# Patient Record
Sex: FEMALE | Race: WHITE | NOT HISPANIC OR LATINO | Employment: UNEMPLOYED | URBAN - METROPOLITAN AREA
[De-identification: names, ages, dates, MRNs, and addresses within clinical notes are randomized per-mention and may not be internally consistent; named-entity substitution may affect disease eponyms.]

---

## 2024-05-08 ENCOUNTER — OFFICE VISIT (OUTPATIENT)
Age: 34
End: 2024-05-08
Payer: COMMERCIAL

## 2024-05-08 VITALS
WEIGHT: 209.7 LBS | DIASTOLIC BLOOD PRESSURE: 76 MMHG | TEMPERATURE: 97.5 F | HEIGHT: 66 IN | SYSTOLIC BLOOD PRESSURE: 120 MMHG | HEART RATE: 81 BPM | BODY MASS INDEX: 33.7 KG/M2

## 2024-05-08 DIAGNOSIS — L40.50 PSORIATIC ARTHRITIS OF MULTIPLE JOINTS: Primary | ICD-10-CM

## 2024-05-08 DIAGNOSIS — R53.83 OTHER FATIGUE: ICD-10-CM

## 2024-05-08 DIAGNOSIS — Z79.899 HIGH RISK MEDICATION USE: ICD-10-CM

## 2024-05-08 DIAGNOSIS — L40.9 PSORIASIS: ICD-10-CM

## 2024-05-08 DIAGNOSIS — E50.9 VITAMIN A DEFICIENCY, UNSPECIFIED: ICD-10-CM

## 2024-05-08 DIAGNOSIS — Z79.899 IMMUNOSUPPRESSION DUE TO DRUG THERAPY: ICD-10-CM

## 2024-05-08 DIAGNOSIS — D84.821 IMMUNOSUPPRESSION DUE TO DRUG THERAPY: ICD-10-CM

## 2024-05-08 DIAGNOSIS — M25.50 ARTHRALGIA OF MULTIPLE SITES: ICD-10-CM

## 2024-11-08 ENCOUNTER — OFFICE VISIT (OUTPATIENT)
Age: 34
End: 2024-11-08
Payer: COMMERCIAL

## 2024-11-08 ENCOUNTER — TELEPHONE (OUTPATIENT)
Age: 34
End: 2024-11-08

## 2024-11-08 VITALS
WEIGHT: 217.7 LBS | TEMPERATURE: 97.2 F | HEIGHT: 66 IN | SYSTOLIC BLOOD PRESSURE: 110 MMHG | BODY MASS INDEX: 34.99 KG/M2 | DIASTOLIC BLOOD PRESSURE: 68 MMHG | HEART RATE: 76 BPM

## 2024-11-08 DIAGNOSIS — Z79.899 IMMUNOSUPPRESSION DUE TO DRUG THERAPY: ICD-10-CM

## 2024-11-08 DIAGNOSIS — D84.821 IMMUNOSUPPRESSION DUE TO DRUG THERAPY: ICD-10-CM

## 2024-11-08 DIAGNOSIS — Z79.899 HIGH RISK MEDICATION USE: ICD-10-CM

## 2024-11-08 DIAGNOSIS — L40.50 PSORIATIC ARTHRITIS OF MULTIPLE JOINTS: Primary | ICD-10-CM

## 2024-11-08 RX ORDER — RISANKIZUMAB-RZAA 150 MG/ML
INJECTION SUBCUTANEOUS
COMMUNITY
Start: 2024-11-05

## 2024-11-08 RX ORDER — IBUPROFEN 800 MG/1
TABLET, FILM COATED ORAL
COMMUNITY
Start: 2024-09-30

## 2024-11-08 RX ORDER — COPPER 313.4 MG/1
1 INTRAUTERINE DEVICE INTRAUTERINE
COMMUNITY

## 2024-11-08 NOTE — PROGRESS NOTES
Office Visit       Date: 11/08/2024   Patient Name: Petty Wilson  MRN: [id number]  YOB: 1990    Referring Physician: Lyric CUEVAS    Chief Complaint: joint pain  Chief Complaint   Patient presents with    Psoriatic arthritis       History of Present Illness: Petty Wilson is a 34 y.o. female who is here today in follow-up for psoriatic rash with psoriasis    She reports she first developed psoriasis around 2022 with the birth of her child.  She has psoriasis scalp, elbows, feet.  For the >2 years she is also experience pain and stiffness particularly in her hands, knees and feet.  She is unable to close her hands completely.  She has morning stiffness for over an hour in her joints.  She notes pain/swelling in her left wrist, finger joints and has difficulty getting her rings on and off.  [facility].  She reports she was given a dose of subcu methotrexate in clinic x1.  She subsequently started on Skyrizi through her dermatologist October 2024.  She has had 2 doses of Skyrizi so far.  Well-tolerated.    No change in her psoriasis or arthritis thus far.      Subjective   Review of Systems: Review of Systems   Constitutional:  Negative for chills, fatigue, fever and unexpected weight loss.   HENT:  Negative for mouth sores, sinus pressure and sore throat.    Eyes:  Negative for pain and redness.   Respiratory:  Negative for cough and shortness of breath.    Cardiovascular:  Negative for chest pain.   Gastrointestinal:  Negative for abdominal pain, blood in stool, diarrhea, nausea, vomiting and GERD.   Endocrine: Negative for polydipsia and polyuria.   Genitourinary:  Negative for dysuria, genital sores and hematuria.   Musculoskeletal:  Positive for arthralgias and joint swelling. Negative for back pain, myalgias, neck pain and neck stiffness.   Skin:  Positive for rash. Negative for bruise.   Allergic/Immunologic: Negative for immunocompromised  state.   Neurological:  Negative for seizures, weakness, numbness and memory problem.   Hematological:  Negative for adenopathy. Does not bruise/bleed easily.   Psychiatric/Behavioral:  Negative for depressed mood. The patient is not nervous/anxious.         Past Medical History:   Past Medical History:   Diagnosis Date    Foot fracture, left     Migraine     Psoriasis        Past Surgical History: History reviewed. No pertinent surgical history.    Family History:   Family History   Problem Relation Age of Onset    Hypertension Mother     Diabetes Son        Social History:   Social History     Socioeconomic History    Marital status: Single   Tobacco Use    Smoking status: Never    Smokeless tobacco: Never   Vaping Use    Vaping status: Never Used   Substance and Sexual Activity    Alcohol use: Yes     Comment: Rarely    Drug use: Never    Sexual activity: Defer       Medications:   Current Outpatient Medications:     ibuprofen (ADVIL,MOTRIN) 800 MG tablet, Take 1 Tablet by mouth 3 (three) times daily as needed for pain, Disp: , Rfl:     Paragard Intrauterine Copper intrauterine device IUD, To be inserted one time by prescriber. Route intrauterine., Disp: , Rfl:     Skyrizi Pen 150 MG/ML solution auto-injector, Inject  under the skin into the appropriate area as directed Every 3 (Three) Months., Disp: , Rfl:     Diclofenac Sodium (VOLTAREN) 1 % gel gel, Apply 2-4 grams by topical route 3-4 times every day to the affected area(s) PRN pain, Disp: 100 g, Rfl: 5    Allergies: No Known Allergies    I have reviewed and updated the patient's chief complaint, history of present illness, review of systems, past medical history, surgical history, family history, social history, medications and allergy list as appropriate.     Objective    Vital Signs:   Vitals:    11/08/24 1015   BP: 110/68   BP Location: Left arm   Patient Position: Sitting   Cuff Size: Adult   Pulse: 76   Temp: 97.2 °F (36.2 °C)   Weight: 98.7 kg (217 lb  "11.2 oz)   Height: 167.6 cm (65.98\")   PainSc:   4       Body mass index is 35.15 kg/m².   BMI cannot be calculated due to outdated height or weight values.  Please input a current height/weight in Vitals and re-renter BMIFOLLOWUP in Note to pull in correct documentation based on BMI range.       Physical Exam:  Physical Exam   MUSCULOSKELETAL:     Swollen and tender second MCP and PIP joints bilateral hands.  Tender left wrist with slight soft tissue swelling ulnar aspect  Unable to fully close hands.  Tender bilateral knees without soft tissue swelling.    Complete joint exam was performed including the MCPs, PIPs, DIPs of the hands, wrists, elbows, shoulders, hips, knees and ankles.  No soft tissue swelling or tenderness is present except as above.    General: The patient is well-developed and well nourished. Cooperative, alert and oriented. Affect is normal. Hydration appears normal.   HEENT: Normocephalic and atraumatic. No notable alopecia. Lids and conjunctiva are normal. Pupils are equal and sclera are clear. Oropharynx is clear   NECK neck is supple without adenopathy, masses or thyromegaly.   CARDIOVASCULAR: Regular rate and rhythm. No murmurs, rubs or gallops   LUNGS: Effort is normal. Lungs are clear bilateral   ABDOMEN: Not examined  EXTREMITIES: Peripheral pulses are intact. No clubbing.   SKIN: Positive psoriasis plaques scalp, extensor surface elbows, feet   no subcutaneous nodules. No digital ulcers. No sclerodactyly.   NEUROLOGIC: Gait is normal. Strength testing is normal.  No focal neurologic deficits    Results Review:   Labs:    No results found for: \"GLUCOSE\", \"BUN\", \"CREATININE\", \"EGFRRESULT\", \"EGFR\", \"BCR\", \"K\", \"CO2\", \"CALCIUM\", \"PROTENTOTREF\", \"ALBUMIN\", \"BILITOT\", \"AST\", \"ALT\"  No results found for: \"WBC\", \"HGB\", \"HCT\", \"MCV\", \"PLT\"  No results found for: \"SEDRATE\"  No results found for: \"CRP\"  No results found for: \"QUANTIFERO\", \"QUANTITB1\", \"QUANTITB2\", \"QUANTIFERN\", \"QUANTIFERM\", " "\"QUANTITBGLDP\"  No results found for: \"RF\"  No results found for: \"HEPBSAG\", \"HEPAIGM\", \"HEPBIGMCORE\", \"HEPCVIRUSABY\"        Procedures      Assessment / Plan   Assessment/plan    Psoriatic arthritis of 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    Brother  at age 3 from rhabdo related to genetic muscle condition  + Psoriasis since   Prior: Methotrexate subq dose x1 only  through derm  *Current: Skyrizi 10/24 through [facility]     -High disease activity from psoriatic arthritis  She just started Skyrizi 1 month ago through dermatology.  2 doses so far.  Well-tolerated.  We discussed that Skyrizi takes 4 to 6 months to be fully effective.  Will give this more time  -We discussed possible addition of methotrexate to the Skyrizi, but she is not interested in this  -Recommend continue as needed NSAID and add topical diclofenac for joint pain  Risks of NSAIDs discussed including GI upset, GI bleeding, renal or hepatic risks and the risk of cardiovascular disease and stroke.  Warned patient not to take other NSAIDs including over-the-counter NSAIDs    Request recent labs and notes from [facility]  Return to clinic 6 months      Psoriasis  [facility]abl[facility] with [facility] in [facility], [facility]    Moderately active-  -started on Skyrizi per dermatology 2024     Arthralgia of multiple sites     Immunosuppression due to drug therapy  Skyrizi per dermatology     we discussed biologic agents at length. Risks and alternative were discussed at length and the option of no treatment was also given. We discussed risks including but not limited to infections which can be unusual,  severe, and deadly. When possible, these agents should be stopped immediately if infections occur. Unusual infection such as TB and fungal infections can occur. There may be an increased risk of lymphoma with these agents. Other risks can include are " multiple sclerosis like illness and worsening of the failure. Infusion or injection reactions whish can be delay have been reported.  Reactivation of daily brain virus hepatitis viruses have been reported.  Worsening of COPD has been seen with Orencia.  Elevated lipids, elevations in liver functions, and dangerous changes in blood counts have been seen with certain agents.  Regular monitoring will be required.We discussed biologic agents at length. Risks and alternative were discussed at length and the option of no treatment was also given. We discussed risks including but not limited to infections which can be unusual,  severe, and deadly. When possible, these agents should be stopped immediately if infections occur. Unusual infection such as TB and fungal infections can occur. There may be an increased risk of lymphoma with these agents. Other risks can include are multiple sclerosis like illness and worsening of the failure. Infusion or injection reactions whish can be delay have been reported.  Reactivation of daily brain virus hepatitis viruses have been reported.  Worsening of COPD has been seen with Orencia.  Elevated lipids, elevations in liver functions, and dangerous changes in blood counts have been seen with certain agents.  Regular monitoring will be required.     High risk medication use           Assessment & Plan  Psoriatic arthritis of multiple joints    Immunosuppression due to drug therapy    High risk medication use           New Medications Ordered This Visit   Medications    Diclofenac Sodium (VOLTAREN) 1 % gel gel     Sig: Apply 2-4 grams by topical route 3-4 times every day to the affected area(s) PRN pain     Dispense:  100 g     Refill:  5         Follow Up:   Return in about 6 months (around 5/8/2025).        Rob Ling MD  [facility] [place], [place] [facility]

## 2024-11-08 NOTE — TELEPHONE ENCOUNTER
Called [facility] and they need a signed medical release form faxed to them before they can send over the records.

## 2024-11-08 NOTE — PATIENT INSTRUCTIONS
Psoriatic Arthritis    Psoriatic arthritis, or PsA, is a long-term (chronic) condition that causes pain, swelling, and stiffness in the joints. The large joints of the legs, hips, and pelvis are most often affected but it can affect any joint in your body.    Most people with PsA have a chronic skin disease that causes itchy scales and patches to form on the skin (psoriasis) before they develop PsA. In some cases, a person may have PsA before or without having psoriasis.    PsA can be mild or severe. If untreated, PsA may cause joint damage.    What are the causes?    The exact cause of this condition is not known. PsA is an autoimmune disease. With this type of disease, the body's defense system (immune system) mistakenly attacks joints and the tissues that connect muscles to joints (tendons).    The disease may be activated by a trigger, such as:  Infections.  Stress.  Alcohol.    What increases the risk?    You are more likely to develop this condition if:  You have psoriasis.  You have a family history of psoriasis or PsA.  You are between the ages of 30 and 50.    What are the signs or symptoms?    The main symptom of this condition is inflammation of joints and tendons. Other symptoms may include:  Joint pain or swelling.  Joint stiffness, especially in the morning.  Swollen fingers and toes.  Pain in areas where tendons connect to bones.  Pitted and weak nails.  Tiredness (fatigue).  Eye redness.    Symptoms of this condition vary from person to person. Symptoms may come and go. Sometimes, symptoms get worse for a period of time. This is called a flare.    How is this diagnosed?    This condition may be diagnosed based on:  Your symptoms and medical history.  A physical exam.  Imaging tests such as an X-ray, a CT scan, or an MRI.  Blood tests to look for inflammation and to rule out other causes, such as gout or rheumatoid arthritis.    You may also be referred to a health care provider who specializes in  treating this condition (rheumatologist).    How is this treated?    The goal of treatment is to reduce pain and inflammation and to protect joints from damage. Treatment depends on how severe the inflammation is and how many joints are affected. Treatment may include:  Medicines.  NSAIDs, such as ibuprofen or naproxen.  Steroids. These can be taken by mouth or injected into a swollen joint.  Disease-modifying anti-rheumatic drugs (DMARDs). These slow the course of the disease.  Biologic medicines. These medicines are usually given as injections or through an IV. They can be very effective, but these medicines can increase the risk of developing infections.  Physical therapy and other exercises to:  Strengthen muscles that support joints.  Prevent joint stiffness.  Lifestyle changes. It is important to rest as needed, eat a healthy diet, and exercise.  A brace or splint to support a painful and swollen joint.  Surgery if you have severe joint damage.  Management of any associated medical conditions. Inflammation from PsA can affect other parts of the body, including the heart, eyes, or kidneys.    Follow these instructions at home:    Managing pain, stiffness, and swelling    If directed, put ice on painful areas. To do this:  If you have a removable brace or splint, remove it as told by your health care provider.  Put ice in a plastic bag.  Place a towel between your skin and the bag.  Leave the ice on for 20 minutes, 2-3 times a day.  Remove the ice if your skin turns bright red. This is very important. If you cannot feel pain, heat, or cold, you have a greater risk of damage to the area.    If you have a removable brace or splint:  Wear the brace or splint as told by your health care provider. Remove it only as told by your health care provider.  Check the skin around the brace or splint every day. Tell your health care provider about any concerns.  Loosen the brace or splint if your fingers or toes tingle,  become numb, or turn cold and blue.  Keep the brace or splint clean and dry.    Activity  Return to your normal activities as told by your health care provider. Ask your health care provider what activities are safe for you.  Get regular exercise. Ask your health care provider what type of exercise is best for you. Your health care provider may recommend:  Low-impact exercises such as walking, biking, or swimming.  Exercises that include stretching, such as nicolette chi and yoga.  Do not exercise when you have a flare of symptoms. Rest until the symptoms improve.    Lifestyle    Maintain a healthy weight. A healthy weight will help you stay active and take stress off your joints.  Eat a healthy diet that includes plenty of vegetables, fruits, whole grains, low-fat dairy products, and lean protein. Do not eat a lot of foods that are high in solid fats, added sugars, or salt.  Use techniques for stress reduction, such as meditation or yoga.  Do not use any products that contain nicotine or tobacco. These products include cigarettes, chewing tobacco, and vaping devices, such as e-cigarettes. If you need help quitting, ask your health care provider.  Consider joining a PsA support group.    General instructions  Take over-the-counter and prescription medicines only as told by your health care provider.  Keep a journal to help track your symptoms. Try to avoid any triggers.  Do not drink alcohol if your health care provider tells you not to drink.  See a mental health therapist if you feel sad, anxious, frustrated, and hopeless. Managing this condition can be challenging and you may feel overwhelmed and depressed.  Keep all follow-up visits. Your health care provider may monitor your condition over time to make sure that it does not cause problems or get worse.    Where to find support  National Psoriasis Foundation: www.psoriasis.org    Where to find more information  American Academy of Dermatology: www.aad.org  American  College of Rheumatology: www.rheumatology.org    Contact a health care provider if:  You have a fever or chills.  Your symptoms get worse.  You feel depressed, frustrated, and hopeless about your condition.    Get help right away if:  You have thoughts of hurting yourself or others.  Get help right away if you feel like you may hurt yourself or others, or have thoughts about taking your own life. Go to your nearest emergency room or:  Call 911.  Call the National Suicide Prevention Lifeline at 1-934.765.7038 or 467. This is open 24 hours a day.  Text the Crisis Text Line at 588891.    Summary  Psoriatic arthritis, or PsA, is a long-term condition that causes pain, swelling, and stiffness in the joints.  The goal of treatment is to reduce pain and inflammation and to protect joints from damage.  Treatment depends on how severe the inflammation is and how many joints are affected.    This information is not intended to replace advice given to you by your health care provider. Make sure you discuss any questions you have with your health care provider.  Document Revised: 02/06/2023 Document Reviewed: 02/06/2023  Elsevier Patient Education © 2024 [employer]

## 2025-05-07 NOTE — PROGRESS NOTES
Office Visit       Date: 05/16/2025   Patient Name: Petty Wilson  MRN: [id number]  YOB: 1990    Referring Physician: Lyric CUEVAS    Chief Complaint: joint pain  Chief Complaint   Patient presents with    Follow-up    Psoriatic arthritid       History of Present Illness: Petty Wilson is a 34 y.o. female who is here today in follow-up for psoriatic arthritis with psoriasis.    History:   She reports she first developed psoriasis around 2022 with the birth of her child.    She has psoriasis scalp, elbows, feet.  For the >2 years she is also experience pain and stiffness particularly in her hands, knees and feet.  She is unable to close her hands completely.  She has morning stiffness for over an hour in her joints.  She notes pain/swelling in her left wrist, finger joints and has difficulty getting her rings on and off.  [facility].  She reports she was given a dose of subcu methotrexate in clinic x1.  She subsequently started on Skyrizi through her dermatologist in [place] October 2024.      Interim 5/16/25:  She reports feeling better. Today she rates her pain 4/10. She has 1 hour of morning stiffness.   She has seen overall improvement since starting Skyrizi.   However, over the last few weeks she has had more psoriasis and joint pain/swelling but thinks this is related to an error with autoinjector.  She believes she lost some of the medication when administering her last dose.  No recent illness/infections. No injection site reactions.   Continues to follow with dermatology.      Subjective   Review of Systems: Review of Systems negative per patient    Past Medical History:   Past Medical History:   Diagnosis Date    Foot fracture, left     Migraine     Psoriasis        Past Surgical History: History reviewed. No pertinent surgical history.    Family History:   Family History   Problem Relation Age of Onset    Hypertension Mother     Diabetes  "Son        Social History:   Social History     Socioeconomic History    Marital status: Single   Tobacco Use    Smoking status: Never    Smokeless tobacco: Never   Vaping Use    Vaping status: Never Used   Substance and Sexual Activity    Alcohol use: Yes     Comment: Rarely    Drug use: Never    Sexual activity: Defer       Medications:   Current Outpatient Medications:     Diclofenac Sodium (VOLTAREN) 1 % gel gel, Apply 2-4 grams by topical route 3-4 times every day to the affected area(s) PRN pain, Disp: 100 g, Rfl: 5    ibuprofen (ADVIL,MOTRIN) 800 MG tablet, Take 1 Tablet by mouth 3 (three) times daily as needed for pain, Disp: , Rfl:     Paragard Intrauterine Copper intrauterine device IUD, To be inserted one time by prescriber. Route intrauterine., Disp: , Rfl:     Skyrizi Pen 150 MG/ML solution auto-injector, Inject  under the skin into the appropriate area as directed Every 3 (Three) Months., Disp: , Rfl:     methylPREDNISolone (MEDROL) 4 MG dose pack, Take as directed on package instructions., Disp: 1 each, Rfl: 0    Allergies: No Known Allergies    I have reviewed and updated the patient's chief complaint, history of present illness, review of systems, past medical history, surgical history, family history, social history, medications and allergy list as appropriate.     Objective    Vital Signs:   Vitals:    05/16/25 0915   BP: 128/68   Pulse: 88   Temp: 97.4 °F (36.3 °C)   Weight: 110 kg (243 lb)   Height: 167.6 cm (66\")   PainSc: 4          Body mass index is 39.22 kg/m².   Defer to PCP      Physical Exam:  Physical Exam   MUSCULOSKELETAL:   Swollen and tender second and third MCP right hand. Tender and swollen right 2nd DIP. Tender left 5th PIP.     Complete joint exam was performed including the MCPs, PIPs, DIPs of the hands, wrists, elbows, shoulders, hips, knees and ankles.  No soft tissue swelling or tenderness is present except as above.    General: The patient is well-developed and well " nourished. Cooperative, alert and oriented. Affect is normal. Hydration appears normal.   HEENT: Normocephalic and atraumatic. No notable alopecia. Lids and conjunctiva are normal. Pupils are equal and sclera are clear. Oropharynx is clear   NECK neck is supple without adenopathy, masses or thyromegaly.   CARDIOVASCULAR: Regular rate and rhythm.   LUNGS: Effort is normal.   ABDOMEN: Not examined  EXTREMITIES: Peripheral pulses are intact. No clubbing.   SKIN: Minimal psoriasis.  No subcutaneous nodules. No digital ulcers. No sclerodactyly.   NEUROLOGIC: Gait is normal. Strength testing is normal.  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    Brother  at age 3 from rhabdo related to genetic muscle condition  + Psoriasis since   Prior: Methotrexate subq dose x1 only  through derm  *Current: Skyrizi 10/24 through [facility] Azalia Vazquez     Moderate disease activity from psoriatic arthritis  Overall she does feel improved since starting Skyrizi in October.  She reports when administering her most recent dose she feels she lost some of the medication.  Since that time she has had increased joint pain and swelling and increased psoriasis activity.  We previously discussed possible addition of methotrexate to the Skyrizi, but she is not interested in this  Continue Skyrizi per Derm  Medrol Dosepak sent today  Recommend continue as needed NSAID and add topical diclofenac for joint pain  Lab order provided today to do in  at her upcoming visit with dermatology  Return to clinic 6 months  Psoriasis  Established with [facility] in [place] Azalia JEREZ  Skyrizi per dermatology 2024 has been helpful  Immunodeficiency due to treatment with immunosuppressive medication  Skyrizi per dermatology   No recent illness/infection    We have discussed biologic agents at length. Risks and  alternative were discussed at length and the option of no treatment was also given. We discussed risks including but not limited to infections which can be unusual,  severe, and deadly. When possible, these agents should be stopped immediately if infections occur. Unusual infection such as TB and fungal infections can occur. There may be an increased risk of lymphoma with these agents. Other risks can include are multiple sclerosis like illness and worsening of the failure. Infusion or injection reactions whish can be delay have been reported.  Reactivation of daily brain virus hepatitis viruses have been reported.  Worsening of COPD has been seen with Orencia.  Elevated lipids, elevations in liver functions, and dangerous changes in blood counts have been seen with certain agents.  Regular monitoring will be required.  Encounter for long-term (current) use of NSAIDs  Ibuprofen 800 mg tablets outside provider    Risks of NSAIDs discussed including GI upset, GI bleeding, renal or hepatic risks and the risk of cardiovascular disease and stroke.  Warned patient not to take other NSAIDs including over-the-counter NSAIDs    Follow Up:   Return in about 6 months (around 11/16/2025) for Dr. Ling.    CECILLE Mcginnis  [facility]

## 2025-05-16 ENCOUNTER — OFFICE VISIT (OUTPATIENT)
Age: 35
End: 2025-05-16
Payer: COMMERCIAL

## 2025-05-16 VITALS
SYSTOLIC BLOOD PRESSURE: 128 MMHG | HEART RATE: 88 BPM | BODY MASS INDEX: 39.05 KG/M2 | DIASTOLIC BLOOD PRESSURE: 68 MMHG | TEMPERATURE: 97.4 F | WEIGHT: 243 LBS | HEIGHT: 66 IN

## 2025-05-16 DIAGNOSIS — Z79.1 ENCOUNTER FOR LONG-TERM (CURRENT) USE OF NSAIDS: ICD-10-CM

## 2025-05-16 DIAGNOSIS — L40.9 PSORIASIS: ICD-10-CM

## 2025-05-16 DIAGNOSIS — D84.821 IMMUNODEFICIENCY DUE TO TREATMENT WITH IMMUNOSUPPRESSIVE MEDICATION: ICD-10-CM

## 2025-05-16 DIAGNOSIS — Z79.60 IMMUNODEFICIENCY DUE TO TREATMENT WITH IMMUNOSUPPRESSIVE MEDICATION: ICD-10-CM

## 2025-05-16 DIAGNOSIS — T45.1X5A IMMUNODEFICIENCY DUE TO TREATMENT WITH IMMUNOSUPPRESSIVE MEDICATION: ICD-10-CM

## 2025-05-16 DIAGNOSIS — L40.50 PSORIATIC ARTHRITIS: Primary | ICD-10-CM

## 2025-05-16 PROBLEM — E28.2 PCOS (POLYCYSTIC OVARIAN SYNDROME): Status: ACTIVE | Noted: 2025-05-16

## 2025-05-16 PROBLEM — J30.9 ALLERGIC RHINITIS: Status: ACTIVE | Noted: 2025-05-16

## 2025-05-16 PROBLEM — D50.9 IRON DEFICIENCY ANEMIA: Status: ACTIVE | Noted: 2025-05-16

## 2025-05-16 RX ORDER — METHYLPREDNISOLONE 4 MG/1
TABLET ORAL
Qty: 1 EACH | Refills: 0 | Status: SHIPPED | OUTPATIENT
Start: 2025-05-16

## 2025-05-16 NOTE — ASSESSMENT & PLAN NOTE
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  2 children age 14 and 3.    Brother  at age 3 from rhabdo related to genetic muscle condition  + Psoriasis since   Prior: Methotrexate subq dose x1 only  through derm  *Current: Skyrizi 10/24 through [facility] Azalia Vazquez     Moderate disease activity from psoriatic arthritis  Overall she does feel improved since starting Skyrizi in October.  She reports when administering her most recent dose she feels she lost some of the medication.  Since that time she has had increased joint pain and swelling and increased psoriasis activity.  We previously discussed possible addition of methotrexate to the Skyrizi, but she is not interested in this  Continue Skyrizi per Derm  Medrol Dosepak sent today  Recommend continue as needed NSAID and add topical diclofenac for joint pain  Lab order provided today to do in  at her upcoming visit with dermatology  Return to clinic 6 months

## 2025-05-16 NOTE — ASSESSMENT & PLAN NOTE
Anselmo per dermatology   No recent illness/infection    We have discussed biologic agents at length. Risks and alternative were discussed at length and the option of no treatment was also given. We discussed risks including but not limited to infections which can be unusual,  severe, and deadly. When possible, these agents should be stopped immediately if infections occur. Unusual infection such as TB and fungal infections can occur. There may be an increased risk of lymphoma with these agents. Other risks can include are multiple sclerosis like illness and worsening of the failure. Infusion or injection reactions whish can be delay have been reported.  Reactivation of daily brain virus hepatitis viruses have been reported.  Worsening of COPD has been seen with Orencia.  Elevated lipids, elevations in liver functions, and dangerous changes in blood counts have been seen with certain agents.  Regular monitoring will be required.

## 2025-05-16 NOTE — ASSESSMENT & PLAN NOTE
Established with [facility] in [facility], Azalia Briones per dermatology October 2024 has been helpful